# Patient Record
Sex: MALE | Race: WHITE
[De-identification: names, ages, dates, MRNs, and addresses within clinical notes are randomized per-mention and may not be internally consistent; named-entity substitution may affect disease eponyms.]

---

## 2017-02-26 PROBLEM — Z00.00 ENCOUNTER FOR PREVENTIVE HEALTH EXAMINATION: Status: ACTIVE | Noted: 2017-02-26

## 2017-03-27 ENCOUNTER — APPOINTMENT (OUTPATIENT)
Dept: UROLOGY | Facility: CLINIC | Age: 32
End: 2017-03-27

## 2017-11-24 ENCOUNTER — APPOINTMENT (OUTPATIENT)
Dept: UROLOGY | Facility: CLINIC | Age: 32
End: 2017-11-24

## 2018-02-26 ENCOUNTER — APPOINTMENT (OUTPATIENT)
Dept: UROLOGY | Facility: CLINIC | Age: 33
End: 2018-02-26
Payer: MEDICAID

## 2018-02-26 VITALS
HEIGHT: 67 IN | SYSTOLIC BLOOD PRESSURE: 112 MMHG | WEIGHT: 176 LBS | HEART RATE: 63 BPM | DIASTOLIC BLOOD PRESSURE: 72 MMHG | BODY MASS INDEX: 27.62 KG/M2

## 2018-02-26 DIAGNOSIS — N46.9 MALE INFERTILITY, UNSPECIFIED: ICD-10-CM

## 2018-02-26 DIAGNOSIS — Z78.9 OTHER SPECIFIED HEALTH STATUS: ICD-10-CM

## 2018-02-26 PROCEDURE — 99214 OFFICE O/P EST MOD 30 MIN: CPT

## 2018-02-26 NOTE — HISTORY OF PRESENT ILLNESS
[FreeTextEntry1] : Robert was seen in 2012 at which time he underwent a left hydrocelectomy, he then was seen in February 2017 as they were trying to conceive again and there was a question as to whether the Rabbi would allow an IUI as they ready have two sons. I sent him for an ultrasound he did not go he did blood test in January 2018 and is now here with his wife for a discussion.  [None] : no symptoms

## 2018-02-26 NOTE — LETTER HEADER
[Dann Beckett MD] : Dann Beckett MD [Director of Urology] : Director of Urology [Director of Male Infertility] : Director of Male Infertility [[address]] : [address] [[address]] : [address] [[place] [address]] : [place] [address] [Tel [phone]] : Tel [phone] [Fax [phone]] : Fax [phone]

## 2018-02-26 NOTE — ASSESSMENT
[FreeTextEntry1] : He wanted to know why he needed intrauterine insemination and I reviewed with him the process from ejaculation through traversing the female genital tract the uterus the fallopian tubes, getting through the zone a pellucid of the condensation, Reformation of now a new being as an embryo the process of progression to a  blastocyst, implantation into the uterus and eventually a child. The question is not why does he need is why don’t we all need. As I reviewed with them the better question is, is there anything we can do to obviate the need for an IUI, and the answer is I see it is no. I do not know why sperm in the Nova is not making it to the egg, it could be immune infertility i.e. she could have IgA secretions which will form an antigen-antibody complex and when that gets into the uterus activate the complement system, it could be a mucous issue as she has “mild” PC OS and when women are on Clomid, something she needs to induce ovulation the mucus can become problematic. If it is the latter they can consider a post vaginal sample as it’s not an antibody issue then but a mucous and if he still has three or 400 million sperm there should be enough obtained from a post vaginal sample to wash and then put back in to the uterus. If it is the former on immune issue then getting it out of the Nova may be too little too late as depending on how long the sperm has contact with the Nova it may already form an antigen-antibody complex. That is something that remains to be seen.\par \par [facility] and if the semen collection device is not allowed, something that may still be allowed as they only have boys and no girls, then all the better. At the [facility] does not allow it then we’ll see if he will allow a post vaginal sample. If he does they will then speak to her doctor and see if he is willing to work with a post vaginal sample. If he is then they will get a sample see what it’s like and if it is good freeze it, go on to an IUI if possible with fresh sperm from that day if not using thawed frozen tissue from the previous sample.

## 2022-04-08 ENCOUNTER — APPOINTMENT (OUTPATIENT)
Dept: ORTHOPEDIC SURGERY | Facility: CLINIC | Age: 37
End: 2022-04-08
Payer: MEDICAID

## 2022-04-08 PROCEDURE — 99204 OFFICE O/P NEW MOD 45 MIN: CPT

## 2022-04-08 PROCEDURE — 73610 X-RAY EXAM OF ANKLE: CPT | Mod: RT

## 2022-04-08 PROCEDURE — 73630 X-RAY EXAM OF FOOT: CPT | Mod: RT

## 2022-04-08 RX ORDER — CHOLECALCIFEROL (VITAMIN D3) 1250 MCG
1.25 MG CAPSULE ORAL
Qty: 10 | Refills: 0 | Status: ACTIVE | COMMUNITY
Start: 2022-04-08 | End: 1900-01-01

## 2022-04-09 VITALS — HEIGHT: 70 IN | RESPIRATION RATE: 16 BRPM | WEIGHT: 180 LBS | BODY MASS INDEX: 25.77 KG/M2

## 2022-04-14 ENCOUNTER — NON-APPOINTMENT (OUTPATIENT)
Age: 37
End: 2022-04-14

## 2022-05-20 ENCOUNTER — APPOINTMENT (OUTPATIENT)
Dept: ORTHOPEDIC SURGERY | Facility: CLINIC | Age: 37
End: 2022-05-20

## 2022-05-20 VITALS — RESPIRATION RATE: 16 BRPM | WEIGHT: 180 LBS | HEIGHT: 70 IN | BODY MASS INDEX: 25.77 KG/M2

## 2022-05-20 PROCEDURE — 73630 X-RAY EXAM OF FOOT: CPT | Mod: RT

## 2022-05-20 PROCEDURE — 99213 OFFICE O/P EST LOW 20 MIN: CPT

## 2022-08-15 ENCOUNTER — APPOINTMENT (OUTPATIENT)
Dept: ORTHOPEDIC SURGERY | Facility: CLINIC | Age: 37
End: 2022-08-15

## 2022-08-15 VITALS — WEIGHT: 180 LBS | BODY MASS INDEX: 25.77 KG/M2 | HEIGHT: 70 IN | RESPIRATION RATE: 16 BRPM

## 2022-08-15 DIAGNOSIS — S92.351A DISPLACED FRACTURE OF FIFTH METATARSAL BONE, RIGHT FOOT, INITIAL ENCOUNTER FOR CLOSED FRACTURE: ICD-10-CM

## 2022-08-15 DIAGNOSIS — M79.671 PAIN IN RIGHT FOOT: ICD-10-CM

## 2022-08-15 PROCEDURE — 73630 X-RAY EXAM OF FOOT: CPT | Mod: RT

## 2022-08-15 PROCEDURE — 99213 OFFICE O/P EST LOW 20 MIN: CPT

## 2022-10-31 PROBLEM — M79.671 RIGHT FOOT PAIN: Status: RESOLVED | Noted: 2022-04-07 | Resolved: 2022-10-31

## 2022-10-31 PROBLEM — S92.351A CLOSED FRACTURE OF BASE OF FIFTH METATARSAL BONE OF RIGHT FOOT: Status: ACTIVE | Noted: 2022-04-08
